# Patient Record
Sex: MALE | Race: WHITE | NOT HISPANIC OR LATINO | Employment: UNEMPLOYED | URBAN - METROPOLITAN AREA
[De-identification: names, ages, dates, MRNs, and addresses within clinical notes are randomized per-mention and may not be internally consistent; named-entity substitution may affect disease eponyms.]

---

## 2017-06-28 ENCOUNTER — TELEPHONE (OUTPATIENT)
Dept: PAIN MEDICINE | Facility: CLINIC | Age: 37
End: 2017-06-28

## 2017-06-28 NOTE — TELEPHONE ENCOUNTER
Pt mother notified before speaking to her about the pt's care, he will have to sign a involvement of care paper. Pt mother verbalized understanding. Pt is aware that we can not schedule him an appt with out a referral from his PCP because of his insurance.Once we receive refer, pt will be schedule with Dr. Velasco. Pt verbalized understanding

## 2017-06-28 NOTE — TELEPHONE ENCOUNTER
----- Message from Elza Flores sent at 2017  9:22 AM CDT -----  Contact: Cara/mother  Syed Hassan  MRN: [id number]  : 1980  PCP: Driss Key  Home Phone      [phone]  Work Phone      Not on file.  Mobile          Not on file.      MESSAGE: The patient's mother is requesting to schedule the patient a new patient appt with Dr. Velasco for back pain and shoulder pain. The patient was advised to see pain management by Dr. Key. The patient has [facility] Connections through medicaid.  Phone:[phone], [phone]

## 2017-06-30 ENCOUNTER — TELEPHONE (OUTPATIENT)
Dept: PAIN MEDICINE | Facility: CLINIC | Age: 37
End: 2017-06-30

## 2017-06-30 NOTE — TELEPHONE ENCOUNTER
Left message with pt mother to have the pt contact the office. Pt's mother verbalized understanding

## 2017-06-30 NOTE — TELEPHONE ENCOUNTER
----- Message from Michelle Carroll MA sent at 6/30/2017  2:46 PM CDT -----  HI,     The referral for this pt was faxed to [facility], however, this pt has [facility] Connections.  I just left a message with the referring providers office that no new medicaid is being accepted at this time.    Just want to let you know.  We will scan this information to pt chart.  Thanks

## 2017-07-06 ENCOUNTER — TELEPHONE (OUTPATIENT)
Dept: PAIN MEDICINE | Facility: CLINIC | Age: 37
End: 2017-07-06

## 2017-07-06 NOTE — TELEPHONE ENCOUNTER
Pt notified that we received his referral and was able to schedule an appt with Dr. Velasco on 08/11/17 at 1pm. Pt verbalized understanding. appt letter mailed

## 2017-07-06 NOTE — TELEPHONE ENCOUNTER
Left message with pt's sister to have the pt contact the office. Pt's sister verbalized understanding

## 2017-08-07 ENCOUNTER — TELEPHONE (OUTPATIENT)
Dept: PAIN MEDICINE | Facility: CLINIC | Age: 37
End: 2017-08-07

## 2017-08-11 ENCOUNTER — TELEPHONE (OUTPATIENT)
Dept: PAIN MEDICINE | Facility: CLINIC | Age: 37
End: 2017-08-11

## 2017-08-11 NOTE — TELEPHONE ENCOUNTER
Pt notified per Sol Key, Dr. Velasco is no longer accepting Medicaid at this time. Pt is aware once we get information on the Pain Management at [facility], we will schedule her an appointment and contact her with the date and time. Pt verbalized understanding

## 2017-08-11 NOTE — TELEPHONE ENCOUNTER
Pt notified per Sol Key, Dr. Velasco is no longer accepting Medicaid at this time. Pt is aware once we get information on the Pain Management at [facility], we will schedule her an appointment and contact her with the date and time. Pt verbalized understanding

## 2017-08-31 ENCOUNTER — TELEPHONE (OUTPATIENT)
Dept: PAIN MEDICINE | Facility: CLINIC | Age: 37
End: 2017-08-31

## 2017-08-31 NOTE — TELEPHONE ENCOUNTER
----- Message from Tanna Guadalupe MA sent at 8/31/2017 10:33 AM CDT -----      ----- Message -----  From: Devi Joseph  Sent: 8/31/2017  10:21 AM  To: Krista Pathak with Dr. Driss Key's Office called.   No. [phone]    Zo has called 3 times.   What is the status of the referral faxed on 6/29/17.   Please call today.

## 2017-12-04 ENCOUNTER — HOSPITAL ENCOUNTER (INPATIENT)
Facility: HOSPITAL | Age: 37
LOS: 1 days | Discharge: HOME | DRG: 897 | End: 2017-12-05
Attending: PSYCHIATRY & NEUROLOGY | Admitting: PSYCHIATRY & NEUROLOGY
Payer: MEDICAID

## 2017-12-04 DIAGNOSIS — R45.851 DEPRESSION WITH SUICIDAL IDEATION: Primary | ICD-10-CM

## 2017-12-04 DIAGNOSIS — F41.8 DEPRESSION WITH ANXIETY: ICD-10-CM

## 2017-12-04 DIAGNOSIS — F32.A DEPRESSION WITH SUICIDAL IDEATION: Primary | ICD-10-CM

## 2017-12-04 PROBLEM — F10.20 ALCOHOL USE DISORDER, SEVERE, DEPENDENCE: Status: ACTIVE | Noted: 2017-12-04

## 2017-12-04 PROBLEM — F17.210 CIGARETTE NICOTINE DEPENDENCE WITHOUT COMPLICATION: Status: ACTIVE | Noted: 2017-12-04

## 2017-12-04 PROBLEM — F10.94 ALCOHOL-INDUCED MOOD DISORDER: Status: ACTIVE | Noted: 2017-12-04

## 2017-12-04 PROCEDURE — 99223 1ST HOSP IP/OBS HIGH 75: CPT | Mod: AI,,, | Performed by: PSYCHIATRY & NEUROLOGY

## 2017-12-04 PROCEDURE — 63600175 PHARM REV CODE 636 W HCPCS: Performed by: PSYCHIATRY & NEUROLOGY

## 2017-12-04 PROCEDURE — 90833 PSYTX W PT W E/M 30 MIN: CPT | Mod: ,,, | Performed by: PSYCHIATRY & NEUROLOGY

## 2017-12-04 PROCEDURE — 25000003 PHARM REV CODE 250: Performed by: PSYCHIATRY & NEUROLOGY

## 2017-12-04 PROCEDURE — 11400000 HC PSYCH PRIVATE ROOM

## 2017-12-04 RX ORDER — ASPIRIN 325 MG
50000 TABLET, DELAYED RELEASE (ENTERIC COATED) ORAL
Status: DISCONTINUED | OUTPATIENT
Start: 2017-12-04 | End: 2017-12-05 | Stop reason: HOSPADM

## 2017-12-04 RX ORDER — THIAMINE HCL 100 MG
100 TABLET ORAL DAILY
Status: DISCONTINUED | OUTPATIENT
Start: 2017-12-04 | End: 2017-12-05 | Stop reason: HOSPADM

## 2017-12-04 RX ORDER — OLANZAPINE 10 MG/2ML
10 INJECTION, POWDER, FOR SOLUTION INTRAMUSCULAR EVERY 4 HOURS PRN
Status: DISCONTINUED | OUTPATIENT
Start: 2017-12-04 | End: 2017-12-05 | Stop reason: HOSPADM

## 2017-12-04 RX ORDER — LOPERAMIDE HYDROCHLORIDE 2 MG/1
2 CAPSULE ORAL
Status: DISCONTINUED | OUTPATIENT
Start: 2017-12-04 | End: 2017-12-05 | Stop reason: HOSPADM

## 2017-12-04 RX ORDER — DOCUSATE SODIUM 100 MG/1
100 CAPSULE, LIQUID FILLED ORAL DAILY PRN
Status: DISCONTINUED | OUTPATIENT
Start: 2017-12-04 | End: 2017-12-05 | Stop reason: HOSPADM

## 2017-12-04 RX ORDER — MAG HYDROX/ALUMINUM HYD/SIMETH 200-200-20
30 SUSPENSION, ORAL (FINAL DOSE FORM) ORAL EVERY 6 HOURS PRN
Status: DISCONTINUED | OUTPATIENT
Start: 2017-12-04 | End: 2017-12-05 | Stop reason: HOSPADM

## 2017-12-04 RX ORDER — CYANOCOBALAMIN 1000 UG/ML
1000 INJECTION, SOLUTION INTRAMUSCULAR; SUBCUTANEOUS ONCE
Status: COMPLETED | OUTPATIENT
Start: 2017-12-04 | End: 2017-12-04

## 2017-12-04 RX ORDER — OLANZAPINE 10 MG/1
10 TABLET ORAL EVERY 4 HOURS PRN
Status: DISCONTINUED | OUTPATIENT
Start: 2017-12-04 | End: 2017-12-05 | Stop reason: HOSPADM

## 2017-12-04 RX ORDER — FOLIC ACID 1 MG/1
1 TABLET ORAL DAILY
Status: DISCONTINUED | OUTPATIENT
Start: 2017-12-04 | End: 2017-12-05 | Stop reason: HOSPADM

## 2017-12-04 RX ORDER — HYDROXYZINE PAMOATE 50 MG/1
50 CAPSULE ORAL EVERY 6 HOURS PRN
Status: DISCONTINUED | OUTPATIENT
Start: 2017-12-04 | End: 2017-12-05 | Stop reason: HOSPADM

## 2017-12-04 RX ORDER — IBUPROFEN 400 MG/1
400 TABLET ORAL EVERY 6 HOURS PRN
Status: DISCONTINUED | OUTPATIENT
Start: 2017-12-04 | End: 2017-12-05 | Stop reason: HOSPADM

## 2017-12-04 RX ADMIN — THERA TABS 1 TABLET: TAB at 02:12

## 2017-12-04 RX ADMIN — Medication 1 TABLET: at 02:12

## 2017-12-04 RX ADMIN — OFLOXACIN 50000 UNITS: 300 TABLET, COATED ORAL at 02:12

## 2017-12-04 RX ADMIN — CYANOCOBALAMIN 1000 MCG: 1000 INJECTION, SOLUTION INTRAMUSCULAR; SUBCUTANEOUS at 02:12

## 2017-12-04 RX ADMIN — Medication 100 MG: at 02:12

## 2017-12-04 RX ADMIN — FOLIC ACID 1 MG: 1 TABLET ORAL at 02:12

## 2017-12-04 NOTE — PLAN OF CARE
Patient ate evening meal.  Mood is good, slept for about an hour earlier this afternoon.  Talking on telephone at this time.  Clear pathways and clutter-free environment maintained.  Will continue to monitor for safety.

## 2017-12-04 NOTE — H&P
"PSYCHIATRY INPATIENT ADMISSION NOTE - H & P      12/4/2017 12:20 PM   Syed aHssan   1980   [id number]           DATE OF ADMISSION: 12/4/2017 12:16 PM    SITE: [facility]    CURRENT LEGAL STATUS: PEC and/or CEC      HISTORY    CHIEF COMPLAINT   Syed Hassan is a 37 y.o. male with no past psychiatric history, currently admitted to the inpatient unit with the following chief complaint: threatening suicide, "I had drank too much and said something stupid."     HPI   (Elements: Location, Quality, Severity, Duration, Timing, Content, Modifying Factors, Associated Signs & Symptoms)    The patient was seen and examined. The chart was reviewed.    The patient presented to the ER on 12/3/17 with complaints of depression and threatening suicide. Per the ER and staff reports:  -Syed Hassan presents to the emergency room with threatening suicidal ideation today  Patient was in argument with his mother he put a knife his throat at home this morning PTA  Police on scene states the patient threatened to kill himself, no previous suicide attempt  Patient is depressed with several social stressors, will not talk to me about drug use now  He is not psychotic or delusional or paranoid, family fears for his safety due to this threat  -Patient had an argument with his mother.  She told him he could no longer stay there and asked him to leave.  He put a knife to his neck and stated that he was going to kill himself.  Mom called the police.  Patient states that he is not suicidal, he just wanted to scare her so she wouldn't kick him out.  Apparent ETOH  -Patient states he doesn't want to stay.  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  Patient asked for the phone to call a friend and a  to "make sure of his legal rights".      The patient was medically cleared and admitted to the U.     The patient reports that he has no prior history of psychiatric symptoms. He " "was doing well, in spite of financial stressors. He reports that he was drinking while watching the [place] game and admittedly "drank too much." He reports that his mother disapproves of drinking, so this caused an argument. While intoxicated, he admits to saying "something along the lines of you would like it if I just ." He admits to making a suicidal statement, but denied any intent. He reportedly said it in order to keep her from kicking him out.     He reports that he normally drinks about a pint of whiskey, up to 4 times per week. He has symptoms of a use disorder as documented below. He denied any history or current symptoms of withdrawal: no seizures, DT's hallucinations or other symptoms. He does have a history of benign essential tremor which complicates his withdrawal history.     He denied all psychiatric symptoms a this time and attributes the recent symptoms to intoxication (BAL was 343 at arrival to the ER).    Denied Symptoms of Depression: no diminished mood or loss of interest/anhedonia; no irritability, diminished energy, change in sleep, change in appetite, diminished concentration or cognition or indecisiveness, PMA/R, excessive guilt or hopelessness or worthlessness, or suicidal ideations    Denied changes in Sleep: no trouble with initiation, maintenance, or early morning awakening with inability to return to sleep    Denied Suicidal/Homicidal ideations: no active/passive ideations, organized plans, or future intentions    Denied Symptoms of psychosis: no hallucinations, delusions, disorganized thinking, disorganized behavior or abnormal motor behavior, or negative symptoms    Denied Symptoms of tonya or hypomania: no elevated, expansive, or irritable mood with no increased energy or activity; with inflated self-esteem or grandiosity, decreased need for sleep, increased rate of speech, FOI or racing thoughts, distractibility, increased goal directed activity or PMA, or risky/disinhibited " behavior    Denied Symptoms of FRANCESCO: no excessive anxiety/worry/fear     Denied Symptoms of Panic Disorder: no recurrent panic attacks; without agoraphobia    Denied Symptoms of PTSD: no h/o trauma; no re-experiencing/intrusive symptoms, avoidant behavior, negative alterations in cognition or mood, or hyperarousal symptoms;  without dissociative symptoms     Denied Symptoms of OCD: no obsessions or compulsions     Denied Symptoms of Eating Disorders: no anorexia, bulimia or binging    +Substance Use: +intoxication, possible withdrawal (tremors), +tolerance, +used in larger amounts or duration than intended, no unsuccessful attempts to limit or quit, no increased time engaging in or seeking out, cravings or strong desire to use, no failure to fulfill obligations, +negative consequences in social/interpersonal/occupational,/recreational areas, +use in dangerous situations, +medical or psychological consequences     +chronic back pain      PSYCHOTHERAPY ADD-ON +[id number]   30 (16-37*) minutes    Time: 16 minutes  Participants: Met with patient    Therapeutic Intervention Type: behavior modifying psychotherapy, supportive psychotherapy  Why chosen therapy is appropriate versus another modality: relevant to diagnosis, patient responds to this modality, evidence based practice    Target symptoms: alcohol abuse, psychosocial stressors  Primary focus: alcohol use, psychosocial stressors  Psychotherapeutic techniques: supportive and motivational techniques; psycho-education     Outcome monitoring methods: self-report, observation    Patient's response to intervention:  The patient's response to intervention is accepting.    Progress toward goals:  The patient's progress toward goals is fair .            PAST PSYCHIATRIC HISTORY  Previous Psychiatric Hospitalizations: denied   Previous SI/HI: denied  Previous Suicide Attempts: denied   Previous Medication Trials: denied  Psychiatric Care (current & past): denied  History of  Psychotherapy: counseling as a child  History of Violence: denied      SUBSTANCE ABUSE HISTORY   Tobacco: 1 ppd at age [age]  Alcohol: as above  Illicit Substances: denied  Misuse of Prescription Medications: denied  Detoxes: denied  Rehabs: denied  12 Step Meetings: AA, court order  Periods of Sobriety: months  Withdrawal: denied (possible h/o alcohol withdrawal        PAST MEDICAL & SURGICAL HISTORY   No past medical history on file.  No past surgical history on file.      CURRENT MEDICATION REGIMEN   Home Meds:   Prior to Admission medications    Not on File         OTC Meds: none    Scheduled Meds:    PRN Meds:    Psychotherapeutics     None            ALLERGIES   Review of patient's allergies indicates:   Allergen Reactions    Bactrim  [sulfamethoxazole-trimethoprim] Edema and Hives    Darvocet a500  [propoxyphene n-acetaminophen] Itching     Other reaction(s): dizziness  Other reaction(s): anxiety    Flomax  [tamsulosin]      Other reaction(s): dizziness    Morphine      Other reaction(s): Headache    Reglan  [metoclopramide] Nausea Only and Nausea And Vomiting     Other reaction(s): shacking         NEUROLOGIC HISTORY  Seizures: denied   Head trauma: denied       FAMILY PSYCHIATRIC HISTORY   No family history on file.           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 Miranda from 12/3/17 at 2:25 PM        EXAMINATION      PHYSICAL EXAM  Reviewed note/exam by Dr. Miranda  from 12/3/17 at 2:25 PM    VITALS   There were no vitals filed for this visit.       PAIN  9-10/10  Subjective report of pain matches objective signs and symptoms: No      LABORATORY DATA   Recent Results (from the past 72 hour(s))   Drug screen panel, emergency    Collection Time: 12/03/17  2:55 PM   Result Value Ref Range    Benzodiazepines Negative     Methadone metabolites Negative     Cocaine (Metab.) Negative     Opiate Scrn, Ur Negative     Barbiturate Screen, Ur Negative     Amphetamine Screen, Ur Negative     THC Negative     Phencyclidine Negative     Creatinine, Random Ur 80.5 23.0 - 375.0 mg/dL    Toxicology Information SEE COMMENT    Urinalysis    Collection Time: 12/03/17  2:55 PM   Result Value Ref Range    Specimen UA Urine, Clean Catch     Color, UA Yellow Yellow, Straw, Laura    Appearance, UA Clear Clear    pH, UA 6.0 5.0 - 8.0    Specific Gravity, UA 1.010 1.005 - 1.030    Protein, UA Negative Negative    Glucose, UA Negative Negative    Ketones, UA Negative Negative    Bilirubin (UA) Negative Negative    Occult Blood UA Negative Negative    Nitrite, UA Negative Negative    Urobilinogen, UA Negative <2.0 EU/dL    Leukocytes, UA Negative Negative   Folate    Collection Time: 12/03/17  3:14 PM   Result Value Ref Range    Folate <2.0 (L) 4.0 - 24.0 ng/mL   Vitamin B12    Collection Time: 12/03/17  3:14 PM   Result Value Ref Range    Vitamin B-12 344 210 - 950 pg/mL   T3, free    Collection Time: 12/03/17  3:14 PM   Result Value Ref Range    T3, Free 3.6 2.3 - 4.2 pg/mL   T4, free    Collection Time: 12/03/17  3:14 PM   Result Value Ref Range    Free T4 0.84 0.71 - 1.51 ng/dL   Vitamin D    Collection Time: 12/03/17  3:14 PM   Result Value Ref Range    Vit D, 25-Hydroxy 29 (L) 30 - 96 ng/mL   Ethanol    Collection Time: 12/03/17  3:14 PM   Result Value Ref Range    Alcohol, Medical, Serum 343 (HH) <10 mg/dL   TSH    Collection Time: 12/03/17  3:14 PM   Result Value Ref Range    TSH 1.100 0.400 -  4.000 uIU/mL   Salicylate level    Collection Time: 12/03/17  3:14 PM   Result Value Ref Range    Salicylate Lvl <5.0 (L) 15.0 - 30.0 mg/dL   Acetaminophen level    Collection Time: 12/03/17  3:14 PM   Result Value Ref Range    Acetaminophen (Tylenol), Serum <3.0 (L) 10.0 - 20.0 ug/mL   CBC auto differential    Collection Time: 12/03/17  3:14 PM   Result Value Ref Range    WBC 7.54 3.90 - 12.70 K/uL    RBC 5.05 4.60 - 6.20 M/uL    Hemoglobin 17.1 14.0 - 18.0 g/dL    Hematocrit 49.4 40.0 - 54.0 %    MCV 98 82 - 98 fL    MCH 33.9 (H) 27.0 - 31.0 pg    MCHC 34.6 32.0 - 36.0 g/dL    RDW 13.2 11.5 - 14.5 %    Platelets 239 150 - 350 K/uL    MPV 8.9 (L) 9.2 - 12.9 fL    Gran # 4.2 1.8 - 7.7 K/uL    Lymph # 2.9 1.0 - 4.8 K/uL    Mono # 0.5 0.3 - 1.0 K/uL    Eos # 0.1 0.0 - 0.5 K/uL    Baso # 0.01 0.00 - 0.20 K/uL    Gran% 55.1 38.0 - 73.0 %    Lymph% 38.1 18.0 - 48.0 %    Mono% 6.0 4.0 - 15.0 %    Eosinophil% 0.7 0.0 - 8.0 %    Basophil% 0.1 0.0 - 1.9 %    Differential Method Automated    Comprehensive metabolic panel    Collection Time: 12/03/17  3:14 PM   Result Value Ref Range    Sodium 145 136 - 145 mmol/L    Potassium 4.2 3.5 - 5.1 mmol/L    Chloride 106 95 - 110 mmol/L    CO2 26 23 - 29 mmol/L    Glucose 89 70 - 110 mg/dL    BUN, Bld 4 (L) 6 - 20 mg/dL    Creatinine 0.8 0.5 - 1.4 mg/dL    Calcium 9.2 8.7 - 10.5 mg/dL    Total Protein 7.5 6.0 - 8.4 g/dL    Albumin 4.2 3.5 - 5.2 g/dL    Total Bilirubin 0.4 0.1 - 1.0 mg/dL    Alkaline Phosphatase 105 55 - 135 U/L    AST 32 10 - 40 U/L    ALT 30 10 - 44 U/L    Anion Gap 13 8 - 16 mmol/L    eGFR if African American >60 >60 mL/min/1.73 m^2    eGFR if non African American >60 >60 mL/min/1.73 m^2   Ethanol    Collection Time: 12/04/17  5:49 AM   Result Value Ref Range    Alcohol, Medical, Serum <10 <10 mg/dL      No results found for: PHENYTOIN, PHENOBARB, VALPROATE, CBMZ        CONSTITUTIONAL  General Appearance: WM, in hospital garb; NAD    MUSCULOSKELETAL  Muscle Strength  "and Tone:  normal  Abnormal Involuntary Movements:  None aside form mild resting tremor  Gait and Station:  normal; non-ataxic    PSYCHIATRIC   Level of Consciousness: awake, alert  Orientation: p/p/t/s  Grooming:  adequate to circumstances  Psychomotor Behavior: no PMA/R  Speech: nl r/t/v/s  Language:  English fluent  Mood: "fine"  Affect: full range, anxious  Thought Process:  linear and organized  Associations:  intact; no ISABEL  Thought Content:  denied AVH/delusions; denied HI/SI  Memory:  intact to recent and remote events  Attention:  intact to conversation; not distractible   Fund of Knowledge:  age and education appropriate  Estimate if Intelligence:  average based on work/education history, vocabulary and mental status exam  Insight:  good- seeks help  Judgment:   good- no bx issues, compliant and cooperative        PSYCHOSOCIAL      PSYCHOSOCIAL STRESSORS   family, financial, occupational and drug and alcohol    FUNCTIONING RELATIONSHIPS   good support system and poor relationship with parents      STRENGTHS AND LIABILITIES   Strength: Patient accepts guidance/feedback, Strength: Patient is expressive/articulate., Liability: Patient lacks social skills., Liability: Patient lacks coping skills.      Is the patient aware of the biomedical complications associated with substance abuse and mental illness? yes    Does the patient have an Advance Directive for Mental Health treatment? no  (If yes, inform patient to bring copy.)        ASSESSMENT     IMPRESSION   Alcohol Induced Mood Disorder  Alcohol Induced Anxiety Disorder    Alcohol Use Disorder, severe, continuous, with physiological dependence   Nicotine dependence     Vitamin D insufficiency  Low B12    Psychosocial Stressors      MEDICAL DECISION MAKING        PROBLEM LIST AND MANAGEMENT PLANS    Depression  Anxiety  Alcohol use  Nicotine use  Vitamin D insufficiency  Low B12  Psychosocial Stressors        PRESCRIPTION DRUG MANAGEMENT  Compliance: " yes  Side Effects: no  Regimen Adjustments:   Depression: pt counseled; symptoms likely alcohol induced and should completely resolve with detox; continue to monitor and treat if indicated    Anxiety: pt counseled; symptoms likely alcohol induced and should completely resolve with detox; continue to monitor and treat if indicated; vistaril prn    Alcohol use: pt counseled; outpatient tx/12-step meetings once stabilized; continue to monitor for s/s of withdrawal and detox as indicated    Nicotine use: pt counseled; nicotine patch daily; he does not want to quit at this time    Vitamin D insufficiency: Vitamin d3 50,000 units po q week x 8 weeks (1/8 given)    Low B12: B12 1000 mcg IM x 1 today; Foltx po q day     Psychosocial Stressors: pt counseled; SW to assist with resources      DIAGNOSTIC TESTING  Labs reviewed; follow up pending labs    Disposition:  -SW to assist with aftercare planning and collateral  -Once stable discharge home with outpatient follow up care and/or rehab  -Continue inpatient treatment under a PEC and/or CEC for danger to self as evident by recently expressed SI. Symptoms were likely alcohol induced and should resolve with detox. Patient should be stable for discharge home tomorrow pending collateral and state of withdrawals.       Chava Flores MD  Psychiatry
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
## 2017-12-05 VITALS
DIASTOLIC BLOOD PRESSURE: 65 MMHG | TEMPERATURE: 99 F | BODY MASS INDEX: 25.02 KG/M2 | RESPIRATION RATE: 18 BRPM | SYSTOLIC BLOOD PRESSURE: 123 MMHG | WEIGHT: 178.75 LBS | HEART RATE: 88 BPM | HEIGHT: 71 IN

## 2017-12-05 PROBLEM — F10.94 ALCOHOL-INDUCED MOOD DISORDER: Status: RESOLVED | Noted: 2017-12-04 | Resolved: 2017-12-05

## 2017-12-05 PROBLEM — K21.9 CHRONIC GERD: Status: ACTIVE | Noted: 2017-12-05

## 2017-12-05 PROBLEM — F41.8 DEPRESSION WITH ANXIETY: Status: RESOLVED | Noted: 2017-12-04 | Resolved: 2017-12-05

## 2017-12-05 LAB
CHOLEST SERPL-MCNC: 192 MG/DL
CHOLEST/HDLC SERPL: 3.5 {RATIO}
HDLC SERPL-MCNC: 55 MG/DL
HDLC SERPL: 28.6 %
LDLC SERPL CALC-MCNC: 116.6 MG/DL
NONHDLC SERPL-MCNC: 137 MG/DL
TRIGL SERPL-MCNC: 102 MG/DL

## 2017-12-05 PROCEDURE — 36415 COLL VENOUS BLD VENIPUNCTURE: CPT

## 2017-12-05 PROCEDURE — 99239 HOSP IP/OBS DSCHRG MGMT >30: CPT | Mod: ,,, | Performed by: PSYCHIATRY & NEUROLOGY

## 2017-12-05 PROCEDURE — 25000003 PHARM REV CODE 250: Performed by: PSYCHIATRY & NEUROLOGY

## 2017-12-05 PROCEDURE — 99222 1ST HOSP IP/OBS MODERATE 55: CPT | Mod: ,,, | Performed by: INTERNAL MEDICINE

## 2017-12-05 PROCEDURE — 80061 LIPID PANEL: CPT

## 2017-12-05 RX ORDER — ASPIRIN 325 MG
50000 TABLET, DELAYED RELEASE (ENTERIC COATED) ORAL
Qty: 7 CAPSULE | Refills: 0 | Status: SHIPPED | OUTPATIENT
Start: 2017-12-11 | End: 2019-09-11

## 2017-12-05 RX ADMIN — Medication 100 MG: at 08:12

## 2017-12-05 RX ADMIN — Medication 1 TABLET: at 08:12

## 2017-12-05 RX ADMIN — FOLIC ACID 1 MG: 1 TABLET ORAL at 08:12

## 2017-12-05 RX ADMIN — THERA TABS 1 TABLET: TAB at 08:12

## 2017-12-05 NOTE — SUBJECTIVE & OBJECTIVE
Past Medical History:   Diagnosis Date    Alcohol abuse     Anxiety     Depression     History of psychiatric hospitalization     Withdrawal symptoms, alcohol        History reviewed. No pertinent surgical history.    Review of patient's allergies indicates:   Allergen Reactions    Bactrim  [sulfamethoxazole-trimethoprim] Edema and Hives    Darvocet a500  [propoxyphene n-acetaminophen] Itching     Other reaction(s): dizziness  Other reaction(s): anxiety    Flomax  [tamsulosin]      Other reaction(s): dizziness    Morphine      Other reaction(s): Headache    Reglan  [metoclopramide] Nausea Only and Nausea And Vomiting     Other reaction(s): shacking       Current Facility-Administered Medications on File Prior to Encounter   Medication    [DISCONTINUED] diphenhydrAMINE injection 50 mg    [DISCONTINUED] haloperidol lactate injection 5 mg    [DISCONTINUED] lorazepam injection 2 mg     No current outpatient prescriptions on file prior to encounter.     Family History     None        Social History Main Topics    Smoking status: Current Every Day Smoker     Packs/day: 1.00     Years: 15.00     Types: Cigarettes     Start date: 1/1/1999    Smokeless tobacco: Never Used      Comment: Counseled on smoking cessation    Alcohol use 1.2 oz/week     2 Cans of beer per week      Comment: Drinks one pint of whiskey daily.      Drug use: No    Sexual activity: No     Review of Systems   Constitutional: Negative for activity change, fatigue, fever and unexpected weight change.   HENT: Negative for congestion, ear pain, hearing loss, rhinorrhea and sore throat.    Eyes: Negative for pain, redness and visual disturbance.   Respiratory: Negative for cough, shortness of breath and wheezing.    Cardiovascular: Negative for chest pain, palpitations and leg swelling.   Gastrointestinal: Positive for nausea and vomiting. Negative for abdominal pain, blood in stool, constipation and diarrhea.   Genitourinary: Negative for  decreased urine volume, dysuria, frequency and urgency.   Musculoskeletal: Negative for back pain, joint swelling and neck pain.   Skin: Negative for color change, rash and wound.   Neurological: Negative for dizziness, tremors, weakness, light-headedness and headaches.     Objective:     Vital Signs (Most Recent):  Temp: 98.8 °F (37.1 °C) (12/04/17 2111)  Pulse: 73 (12/04/17 2111)  Resp: 18 (12/04/17 2111)  BP: 125/81 (12/04/17 2111) Vital Signs (24h Range):  Temp:  [98.2 °F (36.8 °C)-98.9 °F (37.2 °C)] 98.8 °F (37.1 °C)  Pulse:  [61-74] 73  Resp:  [18] 18  BP: (125-142)/(77-91) 125/81     Weight: 81.1 kg (178 lb 12 oz)  Body mass index is 24.93 kg/m².    Physical Exam   Constitutional: He is oriented to person, place, and time. He appears well-developed and well-nourished. No distress.   HENT:   Head: Normocephalic and atraumatic.   Right Ear: External ear normal.   Left Ear: External ear normal.   Mouth/Throat: Oropharynx is clear and moist. No oropharyngeal exudate.   Eyes: Conjunctivae and EOM are normal. Pupils are equal, round, and reactive to light. Right eye exhibits no discharge. Left eye exhibits no discharge.   Neck: Neck supple. No tracheal deviation present.   Cardiovascular: Normal rate and regular rhythm.  Exam reveals no gallop and no friction rub.    No murmur heard.  Pulmonary/Chest: Effort normal and breath sounds normal. No respiratory distress. He has no wheezes. He has no rales.   Abdominal: Soft. Bowel sounds are normal. He exhibits no distension. There is no tenderness. There is no rebound and no guarding.   Musculoskeletal: He exhibits no edema.   Neurological: He is alert and oriented to person, place, and time. No cranial nerve deficit.     Neuro: Cranial nerves:  CN II Visual fields full to confrontation.   CN III, IV, VI Pupils are equal, round, and reactive to light.  CN III: no palsy  Nystagmus: none   Diplopia: none  Ophthalmoparesis: none  CN V Facial sensation intact.   CN VII  Facial expression full, symmetric.   CN VIII normal.   CN IX normal.   CN X normal.   CN XI normal.   CN XII normal.         Skin: Skin is warm and dry.   Psychiatric: He has a normal mood and affect. His behavior is normal.   Nursing note and vitals reviewed.      Significant Labs:   CBC:   Recent Labs  Lab 12/03/17  1514   WBC 7.54   HGB 17.1   HCT 49.4        CMP:   Recent Labs  Lab 12/03/17  1514      K 4.2      CO2 26   GLU 89   BUN 4*   CREATININE 0.8   CALCIUM 9.2   PROT 7.5   ALBUMIN 4.2   BILITOT 0.4   ALKPHOS 105   AST 32   ALT 30   ANIONGAP 13   EGFRNONAA >60     All pertinent labs within the past 24 hours have been reviewed.    Significant Imaging: I have reviewed all pertinent imaging results/findings within the past 24 hours.

## 2017-12-05 NOTE — PLAN OF CARE
"Problem: Patient Care Overview (Adult)  Goal: Interdisciplinary Rounds/Family Conf  TREATMENT TEAM UPDATE    Chief Complaint:  Patient admitted with suicidal ideation, substance use. Patient and mother argued.    Per er   Syed Hassan presents to the emergency room with threatening suicidal ideation today.  Patient was in argument with his mother he put a knife his throat at home this morning PTA.  Police on scene states the patient threatened to kill himself, no previous suicide attempt  Patient is depressed with several social stressors, will not talk to me about drug use now.   He is not psychotic or delusional or paranoid, family fears for his safety due to this threat    Current:  Patient attended treatment team dressed in own clothes. Patient denies SI   "I drank too much and my mom got emotional with me and overexaggerated on what she aid and I overexaggerated on what I did. I Talked with her on the phone last nite and she's ready for me to come home. Says she didn't mean for me to come to the hospital.  A lot of the reason she got aggravated is that she doesn't like my drinking, but I hadn't cleaned up the house. I had just come home from the [facility] and had some mud on the floor and she just blew up on me. Told me I wasn't taking care of her stuff and she knows I have no where to go right now. So I just pulled out my hunting knife and said what do you want me to do. We both just were emotional. I don't like being yelled at.   Maybe I do need to slow down on the drinking. I dont think I need to quit, I like to drink.   We need to talk to each other better. Patient states he will FU with AA meetings.     Plan:  D/C to home   [facility]              "

## 2017-12-05 NOTE — HPI
"Patient admitted from ER with SI. Was drinking EtOH and states it was a "misunderstanding" with his mom. He reports h/o HTN but has been off medicatons for 1 year. Reports h/o GERD s/p Aidan procedure for hiatal hernia. He reports intermittent nausea and vomiting but this is chronic and not on any medications at home.   "

## 2017-12-05 NOTE — CONSULTS
"[facility] Medicine  Consult Note    Patient Name: Syed Hassan  MRN: [id number]  Admission Date: 12/4/2017  Hospital Length of Stay: 1 days  Attending Physician: Chava Flores MD   Primary Care Provider: Driss Key MD           Patient information was obtained from patient and ER records.     Consults  Subjective:     Principal Problem: Alcohol-induced mood disorder    Chief Complaint: No chief complaint on file.       HPI: Patient admitted from ER with SI. Was drinking EtOH and states it was a "misunderstanding" with his mom. He reports h/o HTN but has been off medicatons for 1 year. Reports h/o GERD s/p Aidan procedure for hiatal hernia. He reports intermittent nausea and vomiting but this is chronic and not on any medications at home.     Past Medical History:   Diagnosis Date    Alcohol abuse     Anxiety     Depression     History of psychiatric hospitalization     Withdrawal symptoms, alcohol        History reviewed. No pertinent surgical history.    Review of patient's allergies indicates:   Allergen Reactions    Bactrim  [sulfamethoxazole-trimethoprim] Edema and Hives    Darvocet a500  [propoxyphene n-acetaminophen] Itching     Other reaction(s): dizziness  Other reaction(s): anxiety    Flomax  [tamsulosin]      Other reaction(s): dizziness    Morphine      Other reaction(s): Headache    Reglan  [metoclopramide] Nausea Only and Nausea And Vomiting     Other reaction(s): shacking       Current Facility-Administered Medications on File Prior to Encounter   Medication    [DISCONTINUED] diphenhydrAMINE injection 50 mg    [DISCONTINUED] haloperidol lactate injection 5 mg    [DISCONTINUED] lorazepam injection 2 mg     No current outpatient prescriptions on file prior to encounter.     Family History     None        Social History Main Topics    Smoking status: Current Every Day Smoker     Packs/day: 1.00     Years: 15.00     Types: Cigarettes     Start " date: 1/1/1999    Smokeless tobacco: Never Used      Comment: Counseled on smoking cessation    Alcohol use 1.2 oz/week     2 Cans of beer per week      Comment: Drinks one pint of whiskey daily.      Drug use: No    Sexual activity: No     Review of Systems   Constitutional: Negative for activity change, fatigue, fever and unexpected weight change.   HENT: Negative for congestion, ear pain, hearing loss, rhinorrhea and sore throat.    Eyes: Negative for pain, redness and visual disturbance.   Respiratory: Negative for cough, shortness of breath and wheezing.    Cardiovascular: Negative for chest pain, palpitations and leg swelling.   Gastrointestinal: Positive for nausea and vomiting. Negative for abdominal pain, blood in stool, constipation and diarrhea.   Genitourinary: Negative for decreased urine volume, dysuria, frequency and urgency.   Musculoskeletal: Negative for back pain, joint swelling and neck pain.   Skin: Negative for color change, rash and wound.   Neurological: Negative for dizziness, tremors, weakness, light-headedness and headaches.     Objective:     Vital Signs (Most Recent):  Temp: 98.8 °F (37.1 °C) (12/04/17 2111)  Pulse: 73 (12/04/17 2111)  Resp: 18 (12/04/17 2111)  BP: 125/81 (12/04/17 2111) Vital Signs (24h Range):  Temp:  [98.2 °F (36.8 °C)-98.9 °F (37.2 °C)] 98.8 °F (37.1 °C)  Pulse:  [61-74] 73  Resp:  [18] 18  BP: (125-142)/(77-91) 125/81     Weight: 81.1 kg (178 lb 12 oz)  Body mass index is 24.93 kg/m².    Physical Exam   Constitutional: He is oriented to person, place, and time. He appears well-developed and well-nourished. No distress.   HENT:   Head: Normocephalic and atraumatic.   Right Ear: External ear normal.   Left Ear: External ear normal.   Mouth/Throat: Oropharynx is clear and moist. No oropharyngeal exudate.   Eyes: Conjunctivae and EOM are normal. Pupils are equal, round, and reactive to light. Right eye exhibits no discharge. Left eye exhibits no discharge.   Neck:  Neck supple. No tracheal deviation present.   Cardiovascular: Normal rate and regular rhythm.  Exam reveals no gallop and no friction rub.    No murmur heard.  Pulmonary/Chest: Effort normal and breath sounds normal. No respiratory distress. He has no wheezes. He has no rales.   Abdominal: Soft. Bowel sounds are normal. He exhibits no distension. There is no tenderness. There is no rebound and no guarding.   Musculoskeletal: He exhibits no edema.   Neurological: He is alert and oriented to person, place, and time. No cranial nerve deficit.     Neuro: Cranial nerves:  CN II Visual fields full to confrontation.   CN III, IV, VI Pupils are equal, round, and reactive to light.  CN III: no palsy  Nystagmus: none   Diplopia: none  Ophthalmoparesis: none  CN V Facial sensation intact.   CN VII Facial expression full, symmetric.   CN VIII normal.   CN IX normal.   CN X normal.   CN XI normal.   CN XII normal.         Skin: Skin is warm and dry.   Psychiatric: He has a normal mood and affect. His behavior is normal.   Nursing note and vitals reviewed.      Significant Labs:   CBC:   Recent Labs  Lab 12/03/17  1514   WBC 7.54   HGB 17.1   HCT 49.4        CMP:   Recent Labs  Lab 12/03/17  1514      K 4.2      CO2 26   GLU 89   BUN 4*   CREATININE 0.8   CALCIUM 9.2   PROT 7.5   ALBUMIN 4.2   BILITOT 0.4   ALKPHOS 105   AST 32   ALT 30   ANIONGAP 13   EGFRNONAA >60     All pertinent labs within the past 24 hours have been reviewed.    Significant Imaging: I have reviewed all pertinent imaging results/findings within the past 24 hours.    Assessment/Plan:     * Alcohol-induced mood disorder    Orders per psych          Chronic GERD    Was not on outpatient medications. If needed while inpatient can add pantoprazole 40mg PO Qday but sx seem stable so will not add now          Cigarette nicotine dependence without complication    Orders per psych          Alcohol use disorder, severe, dependence    Orders per  psych          Depression with anxiety    Orders per psych            VTE Risk Mitigation     None              Thank you for your consult. I will sign off. Please contact us if you have any additional questions.    Barbara Baires MD  Department of Hospital Medicine   [facility]

## 2017-12-05 NOTE — PLAN OF CARE
Problem: Patient Care Overview (Adult)  Goal: Plan of Care Review  Outcome: Ongoing (interventions implemented as appropriate)  Pt calm and cooperative, interacts well with staff and peers, denies SI or HI, showered, safety precautions maintained, will continue to monitor

## 2017-12-05 NOTE — ASSESSMENT & PLAN NOTE
Was not on outpatient medications. If needed while inpatient can add pantoprazole 40mg PO Qday but sx seem stable so will not add now

## 2017-12-05 NOTE — PROGRESS NOTES
PSYCHOTHERAPY ADD-ON [id number]   30 (16-37*) minutes      Time: 18 minutes  Participants: Met with patient    Therapeutic Intervention Type: insight oriented psychotherapy, behavior modifying psychotherapy, supportive psychotherapy  Why chosen therapy is appropriate versus another modality: relevant to diagnosis, patient responds to this modality, evidence based practice    Target symptoms: substance abuse  Primary focus: substance use, psychosocial stressors  Psychotherapeutic techniques: supportive, motivational and interpersonal techniques; psycho-education    Outcome monitoring methods: self-report, observation    Patient's response to intervention:  The patient's response to intervention is accepting.    Progress toward goals:  The patient's progress toward goals is good.    Chava Flores MD  Psychiatry

## 2017-12-05 NOTE — DISCHARGE SUMMARY
"Discharge Summary  Psychiatry    Admit Date: 12/4/2017    Discharge Date and Time:  12/05/2017 8:57 AM    Attending Physician: Chava Flores MD     Discharge Provider: Chava Flores MD    Reason for Admission:  threatening suicide, "I had drank too much and said something stupid."     History of Present Illness:   The patient presented to the ER on 12/3/17 with complaints of depression and threatening suicide. Per the ER and staff reports:  -Syed Hassan presents to the emergency room with threatening suicidal ideation today  Patient was in argument with his mother he put a knife his throat at home this morning PTA  Police on scene states the patient threatened to kill himself, no previous suicide attempt  Patient is depressed with several social stressors, will not talk to me about drug use now  He is not psychotic or delusional or paranoid, family fears for his safety due to this threat  -Patient had an argument with his mother.  She told him he could no longer stay there and asked him to leave.  He put a knife to his neck and stated that he was going to kill himself.  Mom called the police.  Patient states that he is not suicidal, he just wanted to scare her so she wouldn't kick him out.  Apparent ETOH  -Patient states he doesn't want to stay.  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  Patient asked for the phone to call a friend and a  to "make sure of his legal rights".       The patient was medically cleared and admitted to the [facility].      The patient reports that he has no prior history of psychiatric symptoms. He was doing well, in spite of financial stressors. He reports that he was drinking while watching the [place] game and admittedly "drank too much." He reports that his mother disapproves of drinking, so this caused an argument. While intoxicated, he admits to saying "something along the lines of you would like it if I just " "." He admits to making a suicidal statement, but denied any intent. He reportedly said it in order to keep her from kicking him out.      He reports that he normally drinks about a pint of whiskey, up to 4 times per week. He has symptoms of a use disorder as documented below. He denied any history or current symptoms of withdrawal: no seizures, DT's hallucinations or other symptoms. He does have a history of benign essential tremor which complicates his withdrawal history.      He denied all psychiatric symptoms a this time and attributes the recent symptoms to intoxication (BAL was 343 at arrival to the ER).     Denied Symptoms of Depression: no diminished mood or loss of interest/anhedonia; no irritability, diminished energy, change in sleep, change in appetite, diminished concentration or cognition or indecisiveness, PMA/R, excessive guilt or hopelessness or worthlessness, or suicidal ideations     Denied changes in Sleep: no trouble with initiation, maintenance, or early morning awakening with inability to return to sleep     Denied Suicidal/Homicidal ideations: no active/passive ideations, organized plans, or future intentions     Denied Symptoms of psychosis: no hallucinations, delusions, disorganized thinking, disorganized behavior or abnormal motor behavior, or negative symptoms     Denied Symptoms of tonya or hypomania: no elevated, expansive, or irritable mood with no increased energy or activity; with inflated self-esteem or grandiosity, decreased need for sleep, increased rate of speech, FOI or racing thoughts, distractibility, increased goal directed activity or PMA, or risky/disinhibited behavior     Denied Symptoms of FRANCESCO: no excessive anxiety/worry/fear      Denied Symptoms of Panic Disorder: no recurrent panic attacks; without agoraphobia     Denied Symptoms of PTSD: no h/o trauma; no re-experiencing/intrusive symptoms, avoidant behavior, negative alterations in cognition or mood, or " hyperarousal symptoms;  without dissociative symptoms      Denied Symptoms of OCD: no obsessions or compulsions      Denied Symptoms of Eating Disorders: no anorexia, bulimia or binging     +Substance Use: +intoxication, possible withdrawal (tremors), +tolerance, +used in larger amounts or duration than intended, no unsuccessful attempts to limit or quit, no increased time engaging in or seeking out, cravings or strong desire to use, no failure to fulfill obligations, +negative consequences in social/interpersonal/occupational,/recreational areas, +use in dangerous situations, +medical or psychological consequences      +chronic back pain    Procedures Performed: * No surgery found *    Hospital Course (synopsis of major diagnoses, care, treatment, and services provided during the course of the hospital stay):   The patient was stabilized and discharged on the following medications:  Depression: symptoms were alcohol induced resolved with detox     Anxiety: symptoms were alcohol induced resolved with detox     Alcohol use: pt counseled; outpatient tx/12-step meetings; no current s/s of withdrawal     Nicotine use: pt counseled; nicotine patch daily; he does not want to quit at this time     Vitamin D insufficiency: Vitamin d3 50,000 units po q week x 8 weeks (1/8 given)     Low B12: B12 1000 mcg IM x ; Foltx po q day      Psychosocial Stressors: pt counseled; SW assisted with resources     The patient was compliant with treatment. The patient denied any side effects.     Denied Symptoms of Depression: no diminished mood or loss of interest/anhedonia; no irritability, diminished energy, change in sleep, change in appetite, diminished concentration or cognition or indecisiveness, PMA/R, excessive guilt or hopelessness or worthlessness, or suicidal ideations     Denied changes in Sleep: no trouble with initiation, maintenance, or early morning awakening with inability to return to sleep     Denied Suicidal/Homicidal  ideations: no active/passive ideations, organized plans, or future intentions     Denied Symptoms of psychosis: no hallucinations, delusions, disorganized thinking, disorganized behavior or abnormal motor behavior, or negative symptoms     Denied Symptoms of tonya or hypomania: no elevated, expansive, or irritable mood with no increased energy or activity; with inflated self-esteem or grandiosity, decreased need for sleep, increased rate of speech, FOI or racing thoughts, distractibility, increased goal directed activity or PMA, or risky/disinhibited behavior    Denied current symptoms of anxiety.    Discussed diagnosis, risks and benefits of proposed treatment vs alternative treatments vs no treatment, and potential side effects of these treatments.  The patient expresses understanding of the above and displays the capacity to agree with this treatment given said understanding.  Patient also agrees that, currently, the benefits outweigh the risks and would like to pursue treatment at this time.    MSE: stated age, casually dressed, well groomed.  No psychomotor agitation or retardation.  No abnormal involuntary movements.  Gait normal.  Speech normal, conversational.  Language fluent English. Mood fine.  Affect normal range, pleasant, euthymic.  Thought process linear.  Associations intact.  Denies suicidal or homicidal ideation.  Denies auditory hallucinations, paranoid ideation, ideas of reference.  Memory intact.  Attention intact.  Fund of knowledge intact.  Insight intact.  Judgment intact.  Alert and oriented to person, place, time.      Tobacco Usage:  Is patient a smoker? Yes  Does patient want prescription for Tobacco Cessation? No  Does patient want counseling for Tobacco Cessation? No    If patient would like to quit, then over the counter nicotine patch could be used. The patient could also follow up with his PCP or psychiatric provider for other alternatives.     Final Diagnoses:    Principal Problem:  Alcohol Induced Mood Disorder- resolved   Secondary Diagnoses:   Alcohol Induced Anxiety Disorder- resolved     Alcohol Use Disorder, severe, continuous, with physiological dependence   Nicotine dependence      Vitamin D insufficiency  Low B12     Psychosocial Stressors    Labs:  Admission on 12/04/2017   Component Date Value Ref Range Status    Cholesterol 12/05/2017 192  120 - 199 mg/dL Final    Triglycerides 12/05/2017 102  30 - 150 mg/dL Final    HDL 12/05/2017 55  40 - 75 mg/dL Final    LDL Cholesterol 12/05/2017 116.6  63.0 - 159.0 mg/dL Final    HDL/Chol Ratio 12/05/2017 28.6  20.0 - 50.0 % Final    Total Cholesterol/HDL Ratio 12/05/2017 3.5  2.0 - 5.0 Final    Non-HDL Cholesterol 12/05/2017 137  mg/dL Final   Admission on 12/03/2017, Discharged on 12/04/2017   Component Date Value Ref Range Status    Folate 12/03/2017 <2.0* 4.0 - 24.0 ng/mL Final    Vitamin B-12 12/03/2017 344  210 - 950 pg/mL Final    T3, Free 12/03/2017 3.6  2.3 - 4.2 pg/mL Final    Free T4 12/03/2017 0.84  0.71 - 1.51 ng/dL Final    Vit D, 25-Hydroxy 12/03/2017 29* 30 - 96 ng/mL Final    Alcohol, Medical, Serum 12/03/2017 343* <10 mg/dL Final    TSH 12/03/2017 1.100  0.400 - 4.000 uIU/mL Final    Benzodiazepines 12/03/2017 Negative   Final    Methadone metabolites 12/03/2017 Negative   Final    Cocaine (Metab.) 12/03/2017 Negative   Final    Opiate Scrn, Ur 12/03/2017 Negative   Final    Barbiturate Screen, Ur 12/03/2017 Negative   Final    Amphetamine Screen, Ur 12/03/2017 Negative   Final    THC 12/03/2017 Negative   Final    Phencyclidine 12/03/2017 Negative   Final    Creatinine, Random Ur 12/03/2017 80.5  23.0 - 375.0 mg/dL Final    Toxicology Information 12/03/2017 SEE COMMENT   Final    Salicylate Lvl 12/03/2017 <5.0* 15.0 - 30.0 mg/dL Final    Acetaminophen (Tylenol), Serum 12/03/2017 <3.0* 10.0 - 20.0 ug/mL Final    WBC 12/03/2017 7.54  3.90 - 12.70 K/uL Final    RBC 12/03/2017 5.05  4.60 - 6.20 M/uL  Final    Hemoglobin 12/03/2017 17.1  14.0 - 18.0 g/dL Final    Hematocrit 12/03/2017 49.4  40.0 - 54.0 % Final    MCV 12/03/2017 98  82 - 98 fL Final    MCH 12/03/2017 33.9* 27.0 - 31.0 pg Final    MCHC 12/03/2017 34.6  32.0 - 36.0 g/dL Final    RDW 12/03/2017 13.2  11.5 - 14.5 % Final    Platelets 12/03/2017 239  150 - 350 K/uL Final    MPV 12/03/2017 8.9* 9.2 - 12.9 fL Final    Gran # 12/03/2017 4.2  1.8 - 7.7 K/uL Final    Lymph # 12/03/2017 2.9  1.0 - 4.8 K/uL Final    Mono # 12/03/2017 0.5  0.3 - 1.0 K/uL Final    Eos # 12/03/2017 0.1  0.0 - 0.5 K/uL Final    Baso # 12/03/2017 0.01  0.00 - 0.20 K/uL Final    Gran% 12/03/2017 55.1  38.0 - 73.0 % Final    Lymph% 12/03/2017 38.1  18.0 - 48.0 % Final    Mono% 12/03/2017 6.0  4.0 - 15.0 % Final    Eosinophil% 12/03/2017 0.7  0.0 - 8.0 % Final    Basophil% 12/03/2017 0.1  0.0 - 1.9 % Final    Differential Method 12/03/2017 Automated   Final    Sodium 12/03/2017 145  136 - 145 mmol/L Final    Potassium 12/03/2017 4.2  3.5 - 5.1 mmol/L Final    Chloride 12/03/2017 106  95 - 110 mmol/L Final    CO2 12/03/2017 26  23 - 29 mmol/L Final    Glucose 12/03/2017 89  70 - 110 mg/dL Final    BUN, Bld 12/03/2017 4* 6 - 20 mg/dL Final    Creatinine 12/03/2017 0.8  0.5 - 1.4 mg/dL Final    Calcium 12/03/2017 9.2  8.7 - 10.5 mg/dL Final    Total Protein 12/03/2017 7.5  6.0 - 8.4 g/dL Final    Albumin 12/03/2017 4.2  3.5 - 5.2 g/dL Final    Total Bilirubin 12/03/2017 0.4  0.1 - 1.0 mg/dL Final    Alkaline Phosphatase 12/03/2017 105  55 - 135 U/L Final    AST 12/03/2017 32  10 - 40 U/L Final    ALT 12/03/2017 30  10 - 44 U/L Final    Anion Gap 12/03/2017 13  8 - 16 mmol/L Final    eGFR if African American 12/03/2017 >60  >60 mL/min/1.73 m^2 Final    eGFR if non African American 12/03/2017 >60  >60 mL/min/1.73 m^2 Final    Specimen UA 12/03/2017 Urine, Clean Catch   Final    Color, UA 12/03/2017 Yellow  Yellow, Straw, Laura Final    Appearance, UA  12/03/2017 Clear  Clear Final    pH, UA 12/03/2017 6.0  5.0 - 8.0 Final    Specific Gravity, UA 12/03/2017 1.010  1.005 - 1.030 Final    Protein, UA 12/03/2017 Negative  Negative Final    Glucose, UA 12/03/2017 Negative  Negative Final    Ketones, UA 12/03/2017 Negative  Negative Final    Bilirubin (UA) 12/03/2017 Negative  Negative Final    Occult Blood UA 12/03/2017 Negative  Negative Final    Nitrite, UA 12/03/2017 Negative  Negative Final    Urobilinogen, UA 12/03/2017 Negative  <2.0 EU/dL Final    Leukocytes, UA 12/03/2017 Negative  Negative Final    Alcohol, Medical, Serum 12/04/2017 <10  <10 mg/dL Final         Discharged Condition: stable and improved; not currently a danger to self/others or gravely disabled    Disposition: Home or Self Care    Is patient being discharged on multiple neuroleptics? No    Follow Up/Patient Instructions:     Medications:  Reconciled Home Medications:   Current Discharge Medication List      START taking these medications    Details   cholecalciferol, vitamin D3, 50,000 unit capsule Take 1 capsule (50,000 Units total) by mouth every 7 days.  Qty: 7 capsule, Refills: 0      folic acid-vit B6-vit B12 2.5-25-2 mg (FOLBIC OR EQUIV) 2.5-25-2 mg Tab Take 1 tablet by mouth once daily.  Qty: 30 tablet, Refills: 1           No discharge procedures on file.    Follow up at [facility]- see  note/discharge note    Diet: regular     Activity as tolerated    Total time spent discharging patient: 32 minutes    Chava Flores MD  Psychiatry

## 2017-12-05 NOTE — NURSING
Patient discharged from [facility].  Patient will be following up at [facility], [address] [place] [id number].  Appointment will be on 12/07/2017 at 8:30 a.m.  Patient will receive a tobacco cessation appointment and substance abuse appointment at above appointment due to being positive for alcohol.  AVS was faxed 12/05/2017 at 12 noon.  Escorted off of unit and out of hospital, accompanied by mental health tech and grandfather.

## 2019-02-04 PROBLEM — G62.1 ALCOHOL-INDUCED POLYNEUROPATHY: Status: ACTIVE | Noted: 2019-02-04

## 2019-09-11 ENCOUNTER — OFFICE VISIT (OUTPATIENT)
Dept: URGENT CARE | Facility: CLINIC | Age: 39
End: 2019-09-11
Payer: MEDICAID

## 2019-09-11 VITALS
RESPIRATION RATE: 20 BRPM | SYSTOLIC BLOOD PRESSURE: 145 MMHG | OXYGEN SATURATION: 98 % | HEART RATE: 88 BPM | HEIGHT: 73 IN | DIASTOLIC BLOOD PRESSURE: 92 MMHG | WEIGHT: 177 LBS | BODY MASS INDEX: 23.46 KG/M2 | TEMPERATURE: 99 F

## 2019-09-11 DIAGNOSIS — K04.7 DENTAL ABSCESS: Primary | ICD-10-CM

## 2019-09-11 DIAGNOSIS — L03.211 FACIAL CELLULITIS: ICD-10-CM

## 2019-09-11 PROCEDURE — 99214 OFFICE O/P EST MOD 30 MIN: CPT | Mod: S$GLB,,, | Performed by: PHYSICIAN ASSISTANT

## 2019-09-11 PROCEDURE — 99214 PR OFFICE/OUTPT VISIT, EST, LEVL IV, 30-39 MIN: ICD-10-PCS | Mod: S$GLB,,, | Performed by: PHYSICIAN ASSISTANT

## 2019-09-11 RX ORDER — CLINDAMYCIN HYDROCHLORIDE 300 MG/1
300 CAPSULE ORAL 3 TIMES DAILY
Qty: 30 CAPSULE | Refills: 0 | Status: SHIPPED | OUTPATIENT
Start: 2019-09-11 | End: 2019-09-11 | Stop reason: CLARIF

## 2019-09-11 RX ORDER — CEFTRIAXONE 1 G/1
1 INJECTION, POWDER, FOR SOLUTION INTRAMUSCULAR; INTRAVENOUS
Status: DISCONTINUED | OUTPATIENT
Start: 2019-09-11 | End: 2019-09-11

## 2019-09-11 NOTE — PROGRESS NOTES
"Subjective:       Patient ID: Syed Hassan is a 39 y.o. male.    Vitals:  height is 6' 1" (1.854 m) and weight is 80.3 kg (177 lb). His tympanic temperature is 98.8 °F (37.1 °C). His blood pressure is 145/92 (abnormal) and his pulse is 88. His respiration is 20 and oxygen saturation is 98%.     Chief Complaint: Dental Pain    Dental Pain    This is a new problem. The current episode started more than 1 year ago. The problem occurs constantly. The problem has been gradually worsening. The pain is at a severity of 8/10. The pain is moderate. Associated symptoms include facial pain and sinus pressure. Pertinent negatives include no fever. He has tried acetaminophen for the symptoms. The treatment provided no relief.       Constitution: Negative for chills, fatigue and fever.   HENT: Positive for dental problem, facial swelling and sinus pressure. Negative for congestion and sore throat.    Neck: Negative for painful lymph nodes.   Cardiovascular: Negative for chest pain and leg swelling.   Eyes: Negative for double vision and blurred vision.   Respiratory: Negative for cough and shortness of breath.    Gastrointestinal: Negative for nausea, vomiting and diarrhea.   Genitourinary: Negative for dysuria, frequency and urgency.   Musculoskeletal: Negative for joint pain, joint swelling, muscle cramps and muscle ache.   Skin: Negative for color change, pale and rash.   Allergic/Immunologic: Negative for seasonal allergies.   Neurological: Negative for dizziness, history of vertigo, light-headedness, passing out and headaches.   Hematologic/Lymphatic: Negative for swollen lymph nodes, easy bruising/bleeding and history of blood clots. Does not bruise/bleed easily.   Psychiatric/Behavioral: Negative for nervous/anxious, sleep disturbance and depression. The patient is not nervous/anxious.        Objective:      Physical Exam   Constitutional: He is oriented to person, place, and time. He appears well-developed and " well-nourished. He is cooperative.  Non-toxic appearance. He does not have a sickly appearance. He does not appear ill. No distress.   HENT:   Head: Normocephalic and atraumatic.       Right Ear: Hearing, tympanic membrane, external ear and ear canal normal.   Left Ear: Hearing, tympanic membrane, external ear and ear canal normal.   Nose: Nose normal. No mucosal edema, rhinorrhea or nasal deformity. No epistaxis. Right sinus exhibits no maxillary sinus tenderness and no frontal sinus tenderness. Left sinus exhibits no maxillary sinus tenderness and no frontal sinus tenderness.   Mouth/Throat: Uvula is midline, oropharynx is clear and moist and mucous membranes are normal. There is trismus (2 finger) in the jaw. Abnormal dentition. Dental abscesses and dental caries present. No uvula swelling. No oropharyngeal exudate, posterior oropharyngeal edema or posterior oropharyngeal erythema.       Eyes: Pupils are equal, round, and reactive to light. Conjunctivae, EOM and lids are normal. No scleral icterus.   Sclera clear bilat   Neck: Trachea normal, full passive range of motion without pain and phonation normal. Neck supple. No neck rigidity. No edema and no erythema present.   Cardiovascular: Normal rate, regular rhythm, normal heart sounds, intact distal pulses and normal pulses.   Pulmonary/Chest: Effort normal and breath sounds normal. No respiratory distress. He has no decreased breath sounds. He has no wheezes. He has no rhonchi. He has no rales.   Abdominal: Soft. Normal appearance and bowel sounds are normal. He exhibits no distension. There is no tenderness.   Musculoskeletal: Normal range of motion. He exhibits no edema or deformity.   Lymphadenopathy:     He has no cervical adenopathy.   Neurological: He is alert and oriented to person, place, and time. He exhibits normal muscle tone. Coordination normal.   Skin: Skin is warm, dry and intact. He is not diaphoretic. No pallor.   Psychiatric: He has a normal  mood and affect. His speech is normal and behavior is normal. Judgment and thought content normal. Cognition and memory are normal.   Nursing note and vitals reviewed.      Assessment:       1. Dental abscess    2. Facial cellulitis        Plan:         Dental abscess  -     Discontinue: clindamycin (CLEOCIN) 300 MG capsule; Take 1 capsule (300 mg total) by mouth 3 (three) times daily. for 10 days  Dispense: 30 capsule; Refill: 0  -     Discontinue: cefTRIAXone injection 1 g  -     Refer to Emergency Dept.    Facial cellulitis  -     Refer to Emergency Dept.      Patient Instructions   Please report directly to the emergency department for further evaluation

## 2021-02-09 ENCOUNTER — HOSPITAL ENCOUNTER (INPATIENT)
Facility: HOSPITAL | Age: 41
LOS: 2 days | Discharge: HOME | DRG: 897 | End: 2021-02-11
Attending: PSYCHIATRY & NEUROLOGY | Admitting: PSYCHIATRY & NEUROLOGY
Payer: MEDICAID

## 2021-02-09 DIAGNOSIS — F10.96: Primary | ICD-10-CM

## 2021-02-09 DIAGNOSIS — F17.210 CIGARETTE NICOTINE DEPENDENCE WITHOUT COMPLICATION: ICD-10-CM

## 2021-02-09 DIAGNOSIS — F10.20 ALCOHOL USE DISORDER, SEVERE, DEPENDENCE: ICD-10-CM

## 2021-02-09 DIAGNOSIS — D53.9 MACROCYTIC ANEMIA: ICD-10-CM

## 2021-02-09 DIAGNOSIS — F10.96: ICD-10-CM

## 2021-02-09 DIAGNOSIS — R79.89 ABNORMAL TSH: ICD-10-CM

## 2021-02-09 PROCEDURE — 11400000 HC PSYCH PRIVATE ROOM

## 2021-02-09 PROCEDURE — 99222 1ST HOSP IP/OBS MODERATE 55: CPT | Mod: ,,, | Performed by: PSYCHIATRY & NEUROLOGY

## 2021-02-09 PROCEDURE — 25000003 PHARM REV CODE 250: Performed by: PSYCHIATRY & NEUROLOGY

## 2021-02-09 PROCEDURE — 90833 PR PSYCHOTHERAPY W/PATIENT W/E&M, 30 MIN (ADD ON): ICD-10-PCS | Mod: ,,, | Performed by: PSYCHIATRY & NEUROLOGY

## 2021-02-09 PROCEDURE — 99222 PR INITIAL HOSPITAL CARE,LEVL II: ICD-10-PCS | Mod: ,,, | Performed by: PSYCHIATRY & NEUROLOGY

## 2021-02-09 PROCEDURE — 90833 PSYTX W PT W E/M 30 MIN: CPT | Mod: ,,, | Performed by: PSYCHIATRY & NEUROLOGY

## 2021-02-09 RX ORDER — THIAMINE HCL 100 MG
100 TABLET ORAL 2 TIMES DAILY
Status: DISCONTINUED | OUTPATIENT
Start: 2021-02-09 | End: 2021-02-11 | Stop reason: HOSPADM

## 2021-02-09 RX ORDER — LANOLIN ALCOHOL/MO/W.PET/CERES
400 CREAM (GRAM) TOPICAL 2 TIMES DAILY
Status: DISCONTINUED | OUTPATIENT
Start: 2021-02-09 | End: 2021-02-11 | Stop reason: HOSPADM

## 2021-02-09 RX ORDER — HYDROXYZINE PAMOATE 50 MG/1
50 CAPSULE ORAL EVERY 6 HOURS PRN
Status: DISCONTINUED | OUTPATIENT
Start: 2021-02-09 | End: 2021-02-11 | Stop reason: HOSPADM

## 2021-02-09 RX ORDER — OLANZAPINE 10 MG/2ML
10 INJECTION, POWDER, FOR SOLUTION INTRAMUSCULAR EVERY 4 HOURS PRN
Status: DISCONTINUED | OUTPATIENT
Start: 2021-02-09 | End: 2021-02-11 | Stop reason: HOSPADM

## 2021-02-09 RX ORDER — PROMETHAZINE HYDROCHLORIDE 25 MG/1
25 TABLET ORAL EVERY 6 HOURS PRN
Status: DISCONTINUED | OUTPATIENT
Start: 2021-02-09 | End: 2021-02-11 | Stop reason: HOSPADM

## 2021-02-09 RX ORDER — DIAZEPAM 5 MG/1
5 TABLET ORAL 3 TIMES DAILY
Status: DISCONTINUED | OUTPATIENT
Start: 2021-02-09 | End: 2021-02-10

## 2021-02-09 RX ORDER — IBUPROFEN 400 MG/1
400 TABLET ORAL EVERY 6 HOURS PRN
Status: DISCONTINUED | OUTPATIENT
Start: 2021-02-09 | End: 2021-02-09

## 2021-02-09 RX ORDER — LOPERAMIDE HYDROCHLORIDE 2 MG/1
2 CAPSULE ORAL
Status: DISCONTINUED | OUTPATIENT
Start: 2021-02-09 | End: 2021-02-11 | Stop reason: HOSPADM

## 2021-02-09 RX ORDER — IBUPROFEN 800 MG/1
800 TABLET ORAL EVERY 8 HOURS PRN
Status: DISCONTINUED | OUTPATIENT
Start: 2021-02-09 | End: 2021-02-11 | Stop reason: HOSPADM

## 2021-02-09 RX ORDER — FOLIC ACID 1 MG/1
1 TABLET ORAL DAILY
Status: DISCONTINUED | OUTPATIENT
Start: 2021-02-09 | End: 2021-02-09

## 2021-02-09 RX ORDER — IBUPROFEN 200 MG
1 TABLET ORAL DAILY PRN
Status: DISCONTINUED | OUTPATIENT
Start: 2021-02-09 | End: 2021-02-11 | Stop reason: HOSPADM

## 2021-02-09 RX ORDER — ONDANSETRON HYDROCHLORIDE 4 MG/5ML
4 SOLUTION ORAL EVERY 6 HOURS PRN
Status: DISCONTINUED | OUTPATIENT
Start: 2021-02-09 | End: 2021-02-11 | Stop reason: HOSPADM

## 2021-02-09 RX ORDER — DOCUSATE SODIUM 100 MG/1
100 CAPSULE, LIQUID FILLED ORAL DAILY PRN
Status: DISCONTINUED | OUTPATIENT
Start: 2021-02-09 | End: 2021-02-11 | Stop reason: HOSPADM

## 2021-02-09 RX ORDER — LEVOTHYROXINE SODIUM 25 UG/1
25 TABLET ORAL
Status: DISCONTINUED | OUTPATIENT
Start: 2021-02-10 | End: 2021-02-10

## 2021-02-09 RX ORDER — OLANZAPINE 10 MG/1
10 TABLET ORAL EVERY 4 HOURS PRN
Status: DISCONTINUED | OUTPATIENT
Start: 2021-02-09 | End: 2021-02-11 | Stop reason: HOSPADM

## 2021-02-09 RX ORDER — MAG HYDROX/ALUMINUM HYD/SIMETH 200-200-20
30 SUSPENSION, ORAL (FINAL DOSE FORM) ORAL EVERY 6 HOURS PRN
Status: DISCONTINUED | OUTPATIENT
Start: 2021-02-09 | End: 2021-02-11 | Stop reason: HOSPADM

## 2021-02-09 RX ORDER — ONDANSETRON 4 MG/1
8 TABLET, ORALLY DISINTEGRATING ORAL EVERY 8 HOURS PRN
Status: DISCONTINUED | OUTPATIENT
Start: 2021-02-09 | End: 2021-02-09 | Stop reason: SDUPTHER

## 2021-02-09 RX ADMIN — ONDANSETRON 8 MG: 4 TABLET, ORALLY DISINTEGRATING ORAL at 01:02

## 2021-02-09 RX ADMIN — Medication 100 MG: at 01:02

## 2021-02-09 RX ADMIN — HYDROXYZINE PAMOATE 50 MG: 50 CAPSULE ORAL at 01:02

## 2021-02-09 RX ADMIN — DIAZEPAM 5 MG: 5 TABLET ORAL at 02:02

## 2021-02-09 RX ADMIN — FOLIC ACID 1 MG: 1 TABLET ORAL at 11:02

## 2021-02-09 RX ADMIN — THERA TABS 1 TABLET: TAB at 11:02

## 2021-02-09 RX ADMIN — DIAZEPAM 5 MG: 5 TABLET ORAL at 08:02

## 2021-02-09 RX ADMIN — Medication 400 MG: at 08:02

## 2021-02-09 RX ADMIN — IBUPROFEN 400 MG: 400 TABLET, FILM COATED ORAL at 11:02

## 2021-02-09 RX ADMIN — Medication 100 MG: at 08:02

## 2021-02-10 PROBLEM — E53.8 B12 DEFICIENCY: Status: RESOLVED | Noted: 2021-02-10 | Resolved: 2021-02-10

## 2021-02-10 PROBLEM — R79.89 ABNORMAL TSH: Status: ACTIVE | Noted: 2021-02-10

## 2021-02-10 PROBLEM — D53.9 MACROCYTIC ANEMIA: Status: ACTIVE | Noted: 2021-02-10

## 2021-02-10 PROBLEM — E53.8 B12 DEFICIENCY: Status: ACTIVE | Noted: 2021-02-10

## 2021-02-10 LAB
CHOLEST SERPL-MCNC: 115 MG/DL (ref 120–199)
CHOLEST/HDLC SERPL: 3.7 {RATIO} (ref 2–5)
ESTIMATED AVG GLUCOSE: 91 MG/DL (ref 68–131)
FOLATE SERPL-MCNC: 8.4 NG/ML (ref 4–24)
HBA1C MFR BLD: 4.8 % (ref 4–5.6)
HDLC SERPL-MCNC: 31 MG/DL (ref 40–75)
HDLC SERPL: 27 % (ref 20–50)
LDLC SERPL CALC-MCNC: 64.8 MG/DL (ref 63–159)
NONHDLC SERPL-MCNC: 84 MG/DL
TRIGL SERPL-MCNC: 96 MG/DL (ref 30–150)
VIT B12 SERPL-MCNC: 554 PG/ML (ref 210–950)

## 2021-02-10 PROCEDURE — 90833 PSYTX W PT W E/M 30 MIN: CPT | Mod: ,,, | Performed by: PSYCHIATRY & NEUROLOGY

## 2021-02-10 PROCEDURE — 80061 LIPID PANEL: CPT

## 2021-02-10 PROCEDURE — 99222 PR INITIAL HOSPITAL CARE,LEVL II: ICD-10-PCS | Mod: ,,, | Performed by: NURSE PRACTITIONER

## 2021-02-10 PROCEDURE — 83036 HEMOGLOBIN GLYCOSYLATED A1C: CPT

## 2021-02-10 PROCEDURE — 36415 COLL VENOUS BLD VENIPUNCTURE: CPT

## 2021-02-10 PROCEDURE — 99233 PR SUBSEQUENT HOSPITAL CARE,LEVL III: ICD-10-PCS | Mod: ,,, | Performed by: PSYCHIATRY & NEUROLOGY

## 2021-02-10 PROCEDURE — 99222 1ST HOSP IP/OBS MODERATE 55: CPT | Mod: ,,, | Performed by: NURSE PRACTITIONER

## 2021-02-10 PROCEDURE — 11400000 HC PSYCH PRIVATE ROOM

## 2021-02-10 PROCEDURE — 25000003 PHARM REV CODE 250: Performed by: PSYCHIATRY & NEUROLOGY

## 2021-02-10 PROCEDURE — 99233 SBSQ HOSP IP/OBS HIGH 50: CPT | Mod: ,,, | Performed by: PSYCHIATRY & NEUROLOGY

## 2021-02-10 PROCEDURE — 82607 VITAMIN B-12: CPT

## 2021-02-10 PROCEDURE — 90833 PR PSYCHOTHERAPY W/PATIENT W/E&M, 30 MIN (ADD ON): ICD-10-PCS | Mod: ,,, | Performed by: PSYCHIATRY & NEUROLOGY

## 2021-02-10 PROCEDURE — 82746 ASSAY OF FOLIC ACID SERUM: CPT

## 2021-02-10 PROCEDURE — 84425 ASSAY OF VITAMIN B-1: CPT

## 2021-02-10 RX ORDER — DIAZEPAM 5 MG/1
5 TABLET ORAL 2 TIMES DAILY
Status: DISCONTINUED | OUTPATIENT
Start: 2021-02-10 | End: 2021-02-11 | Stop reason: HOSPADM

## 2021-02-10 RX ADMIN — THERA TABS 1 TABLET: TAB at 08:02

## 2021-02-10 RX ADMIN — DIAZEPAM 5 MG: 5 TABLET ORAL at 08:02

## 2021-02-10 RX ADMIN — Medication 100 MG: at 08:02

## 2021-02-10 RX ADMIN — LEVOTHYROXINE SODIUM 25 MCG: 25 TABLET ORAL at 06:02

## 2021-02-10 RX ADMIN — Medication 400 MG: at 08:02

## 2021-02-10 RX ADMIN — FOLIC ACID-PYRIDOXINE-CYANOCOBALAMIN TAB 2.5-25-2 MG 1 TABLET: 2.5-25-2 TAB at 09:02

## 2021-02-11 VITALS
WEIGHT: 155.63 LBS | SYSTOLIC BLOOD PRESSURE: 108 MMHG | HEIGHT: 69 IN | DIASTOLIC BLOOD PRESSURE: 74 MMHG | BODY MASS INDEX: 23.05 KG/M2 | HEART RATE: 104 BPM | TEMPERATURE: 98 F | RESPIRATION RATE: 18 BRPM

## 2021-02-11 PROCEDURE — 25000003 PHARM REV CODE 250: Performed by: PSYCHIATRY & NEUROLOGY

## 2021-02-11 PROCEDURE — 90833 PR PSYCHOTHERAPY W/PATIENT W/E&M, 30 MIN (ADD ON): ICD-10-PCS | Mod: ,,, | Performed by: PSYCHIATRY & NEUROLOGY

## 2021-02-11 PROCEDURE — 99239 HOSP IP/OBS DSCHRG MGMT >30: CPT | Mod: ,,, | Performed by: PSYCHIATRY & NEUROLOGY

## 2021-02-11 PROCEDURE — 99239 PR HOSPITAL DISCHARGE DAY,>30 MIN: ICD-10-PCS | Mod: ,,, | Performed by: PSYCHIATRY & NEUROLOGY

## 2021-02-11 PROCEDURE — 90833 PSYTX W PT W E/M 30 MIN: CPT | Mod: ,,, | Performed by: PSYCHIATRY & NEUROLOGY

## 2021-02-11 RX ORDER — LANOLIN ALCOHOL/MO/W.PET/CERES
100 CREAM (GRAM) TOPICAL 2 TIMES DAILY
Qty: 60 TABLET | Refills: 1 | Status: ON HOLD | OUTPATIENT
Start: 2021-02-11 | End: 2021-03-22 | Stop reason: HOSPADM

## 2021-02-11 RX ORDER — LEVOTHYROXINE SODIUM 25 UG/1
25 TABLET ORAL
Qty: 30 TABLET | Refills: 1 | Status: ON HOLD | OUTPATIENT
Start: 2021-02-11 | End: 2021-03-22 | Stop reason: HOSPADM

## 2021-02-11 RX ORDER — IBUPROFEN 200 MG
1 TABLET ORAL DAILY
Status: ON HOLD | COMMUNITY
Start: 2021-02-11 | End: 2021-03-22 | Stop reason: HOSPADM

## 2021-02-11 RX ORDER — LANOLIN ALCOHOL/MO/W.PET/CERES
400 CREAM (GRAM) TOPICAL 2 TIMES DAILY
Qty: 60 TABLET | Refills: 1 | Status: ON HOLD | OUTPATIENT
Start: 2021-02-11 | End: 2021-03-22 | Stop reason: HOSPADM

## 2021-02-11 RX ADMIN — THERA TABS 1 TABLET: TAB at 08:02

## 2021-02-11 RX ADMIN — FOLIC ACID-PYRIDOXINE-CYANOCOBALAMIN TAB 2.5-25-2 MG 1 TABLET: 2.5-25-2 TAB at 08:02

## 2021-02-11 RX ADMIN — Medication 400 MG: at 08:02

## 2021-02-11 RX ADMIN — DIAZEPAM 5 MG: 5 TABLET ORAL at 08:02

## 2021-02-11 RX ADMIN — Medication 100 MG: at 08:02

## 2021-02-12 LAB — VIT B1 BLD-MCNC: 37 UG/L (ref 38–122)

## 2021-03-19 ENCOUNTER — HOSPITAL ENCOUNTER (INPATIENT)
Facility: HOSPITAL | Age: 41
LOS: 3 days | Discharge: HOME | DRG: 897 | End: 2021-03-22
Attending: STUDENT IN AN ORGANIZED HEALTH CARE EDUCATION/TRAINING PROGRAM | Admitting: STUDENT IN AN ORGANIZED HEALTH CARE EDUCATION/TRAINING PROGRAM
Payer: MEDICAID

## 2021-03-19 DIAGNOSIS — F41.1 GAD (GENERALIZED ANXIETY DISORDER): ICD-10-CM

## 2021-03-19 DIAGNOSIS — E03.8 OTHER SPECIFIED HYPOTHYROIDISM: ICD-10-CM

## 2021-03-19 DIAGNOSIS — F10.939 WITHDRAWAL SYMPTOMS, ALCOHOL: ICD-10-CM

## 2021-03-19 DIAGNOSIS — F10.20 ALCOHOL USE DISORDER, SEVERE, DEPENDENCE: ICD-10-CM

## 2021-03-19 DIAGNOSIS — F10.94 ALCOHOL-INDUCED MOOD DISORDER: Primary | ICD-10-CM

## 2021-03-19 DIAGNOSIS — R45.851 SUICIDAL IDEATIONS: ICD-10-CM

## 2021-03-19 PROCEDURE — 11400000 HC PSYCH PRIVATE ROOM

## 2021-03-19 RX ORDER — OLANZAPINE 10 MG/2ML
10 INJECTION, POWDER, FOR SOLUTION INTRAMUSCULAR EVERY 4 HOURS PRN
Status: DISCONTINUED | OUTPATIENT
Start: 2021-03-19 | End: 2021-03-22 | Stop reason: HOSPADM

## 2021-03-19 RX ORDER — OLANZAPINE 10 MG/1
10 TABLET ORAL EVERY 4 HOURS PRN
Status: DISCONTINUED | OUTPATIENT
Start: 2021-03-19 | End: 2021-03-22 | Stop reason: HOSPADM

## 2021-03-19 RX ORDER — IBUPROFEN 200 MG
1 TABLET ORAL DAILY PRN
Status: DISCONTINUED | OUTPATIENT
Start: 2021-03-19 | End: 2021-03-22 | Stop reason: HOSPADM

## 2021-03-19 RX ORDER — IBUPROFEN 400 MG/1
400 TABLET ORAL EVERY 6 HOURS PRN
Status: DISCONTINUED | OUTPATIENT
Start: 2021-03-19 | End: 2021-03-22 | Stop reason: HOSPADM

## 2021-03-19 RX ORDER — HYDROXYZINE HYDROCHLORIDE 25 MG/1
50 TABLET, FILM COATED ORAL NIGHTLY PRN
Status: DISCONTINUED | OUTPATIENT
Start: 2021-03-19 | End: 2021-03-22 | Stop reason: HOSPADM

## 2021-03-19 RX ORDER — THIAMINE HCL 100 MG
100 TABLET ORAL DAILY
Status: DISCONTINUED | OUTPATIENT
Start: 2021-03-20 | End: 2021-03-22 | Stop reason: HOSPADM

## 2021-03-19 RX ORDER — FOLIC ACID 1 MG/1
1 TABLET ORAL DAILY
Status: DISCONTINUED | OUTPATIENT
Start: 2021-03-20 | End: 2021-03-22 | Stop reason: HOSPADM

## 2021-03-20 PROBLEM — E03.8 OTHER SPECIFIED HYPOTHYROIDISM: Status: ACTIVE | Noted: 2021-03-20

## 2021-03-20 PROCEDURE — 99221 1ST HOSP IP/OBS SF/LOW 40: CPT | Mod: ,,, | Performed by: FAMILY MEDICINE

## 2021-03-20 PROCEDURE — 25000003 PHARM REV CODE 250: Performed by: STUDENT IN AN ORGANIZED HEALTH CARE EDUCATION/TRAINING PROGRAM

## 2021-03-20 PROCEDURE — 25000003 PHARM REV CODE 250: Performed by: FAMILY MEDICINE

## 2021-03-20 PROCEDURE — 99223 PR INITIAL HOSPITAL CARE,LEVL III: ICD-10-PCS | Mod: ,,, | Performed by: STUDENT IN AN ORGANIZED HEALTH CARE EDUCATION/TRAINING PROGRAM

## 2021-03-20 PROCEDURE — 11400000 HC PSYCH PRIVATE ROOM

## 2021-03-20 PROCEDURE — 99223 1ST HOSP IP/OBS HIGH 75: CPT | Mod: ,,, | Performed by: STUDENT IN AN ORGANIZED HEALTH CARE EDUCATION/TRAINING PROGRAM

## 2021-03-20 PROCEDURE — 99221 PR INITIAL HOSPITAL CARE,LEVL I: ICD-10-PCS | Mod: ,,, | Performed by: FAMILY MEDICINE

## 2021-03-20 RX ORDER — DIAZEPAM 10 MG/1
10 TABLET ORAL 3 TIMES DAILY
Status: DISCONTINUED | OUTPATIENT
Start: 2021-03-20 | End: 2021-03-21

## 2021-03-20 RX ORDER — LEVOTHYROXINE SODIUM 25 UG/1
50 TABLET ORAL
Status: DISCONTINUED | OUTPATIENT
Start: 2021-03-20 | End: 2021-03-22 | Stop reason: HOSPADM

## 2021-03-20 RX ORDER — GABAPENTIN 300 MG/1
300 CAPSULE ORAL 3 TIMES DAILY
Status: DISCONTINUED | OUTPATIENT
Start: 2021-03-20 | End: 2021-03-21

## 2021-03-20 RX ORDER — DIAZEPAM 10 MG/1
10 TABLET ORAL 2 TIMES DAILY
Status: DISCONTINUED | OUTPATIENT
Start: 2021-03-20 | End: 2021-03-20

## 2021-03-20 RX ADMIN — DIAZEPAM 10 MG: 10 TABLET ORAL at 01:03

## 2021-03-20 RX ADMIN — FOLIC ACID 1 MG: 1 TABLET ORAL at 09:03

## 2021-03-20 RX ADMIN — DIAZEPAM 10 MG: 10 TABLET ORAL at 08:03

## 2021-03-20 RX ADMIN — GABAPENTIN 300 MG: 300 CAPSULE ORAL at 03:03

## 2021-03-20 RX ADMIN — THIAMINE HCL TAB 100 MG 100 MG: 100 TAB at 09:03

## 2021-03-20 RX ADMIN — THERA TABS 1 TABLET: TAB at 09:03

## 2021-03-20 RX ADMIN — GABAPENTIN 300 MG: 300 CAPSULE ORAL at 08:03

## 2021-03-20 RX ADMIN — LEVOTHYROXINE SODIUM 50 MCG: 25 TABLET ORAL at 12:03

## 2021-03-21 PROCEDURE — 99233 PR SUBSEQUENT HOSPITAL CARE,LEVL III: ICD-10-PCS | Mod: ,,, | Performed by: STUDENT IN AN ORGANIZED HEALTH CARE EDUCATION/TRAINING PROGRAM

## 2021-03-21 PROCEDURE — 11400000 HC PSYCH PRIVATE ROOM

## 2021-03-21 PROCEDURE — S4991 NICOTINE PATCH NONLEGEND: HCPCS | Performed by: STUDENT IN AN ORGANIZED HEALTH CARE EDUCATION/TRAINING PROGRAM

## 2021-03-21 PROCEDURE — 25000003 PHARM REV CODE 250: Performed by: FAMILY MEDICINE

## 2021-03-21 PROCEDURE — 99233 SBSQ HOSP IP/OBS HIGH 50: CPT | Mod: ,,, | Performed by: STUDENT IN AN ORGANIZED HEALTH CARE EDUCATION/TRAINING PROGRAM

## 2021-03-21 PROCEDURE — 25000003 PHARM REV CODE 250: Performed by: STUDENT IN AN ORGANIZED HEALTH CARE EDUCATION/TRAINING PROGRAM

## 2021-03-21 RX ORDER — GABAPENTIN 300 MG/1
600 CAPSULE ORAL 3 TIMES DAILY
Status: DISCONTINUED | OUTPATIENT
Start: 2021-03-21 | End: 2021-03-22 | Stop reason: HOSPADM

## 2021-03-21 RX ORDER — DIAZEPAM 10 MG/1
10 TABLET ORAL 2 TIMES DAILY
Status: DISCONTINUED | OUTPATIENT
Start: 2021-03-21 | End: 2021-03-22

## 2021-03-21 RX ADMIN — DIAZEPAM 10 MG: 10 TABLET ORAL at 08:03

## 2021-03-21 RX ADMIN — LEVOTHYROXINE SODIUM 50 MCG: 25 TABLET ORAL at 05:03

## 2021-03-21 RX ADMIN — GABAPENTIN 600 MG: 300 CAPSULE ORAL at 08:03

## 2021-03-21 RX ADMIN — GABAPENTIN 300 MG: 300 CAPSULE ORAL at 08:03

## 2021-03-21 RX ADMIN — FOLIC ACID 1 MG: 1 TABLET ORAL at 08:03

## 2021-03-21 RX ADMIN — THIAMINE HCL TAB 100 MG 100 MG: 100 TAB at 08:03

## 2021-03-21 RX ADMIN — HYDROXYZINE HYDROCHLORIDE 50 MG: 25 TABLET, FILM COATED ORAL at 10:03

## 2021-03-21 RX ADMIN — NICOTINE 1 PATCH: 14 PATCH, EXTENDED RELEASE TRANSDERMAL at 02:03

## 2021-03-21 RX ADMIN — THERA TABS 1 TABLET: TAB at 08:03

## 2021-03-21 RX ADMIN — GABAPENTIN 600 MG: 300 CAPSULE ORAL at 02:03

## 2021-03-22 VITALS
RESPIRATION RATE: 20 BRPM | HEART RATE: 89 BPM | HEIGHT: 71 IN | SYSTOLIC BLOOD PRESSURE: 120 MMHG | WEIGHT: 168 LBS | DIASTOLIC BLOOD PRESSURE: 59 MMHG | TEMPERATURE: 98 F | BODY MASS INDEX: 23.52 KG/M2

## 2021-03-22 PROBLEM — R45.851 SUICIDAL IDEATIONS: Status: RESOLVED | Noted: 2021-03-19 | Resolved: 2021-03-22

## 2021-03-22 PROCEDURE — 90833 PR PSYCHOTHERAPY W/PATIENT W/E&M, 30 MIN (ADD ON): ICD-10-PCS | Mod: ,,, | Performed by: PSYCHIATRY & NEUROLOGY

## 2021-03-22 PROCEDURE — 90833 PSYTX W PT W E/M 30 MIN: CPT | Mod: ,,, | Performed by: PSYCHIATRY & NEUROLOGY

## 2021-03-22 PROCEDURE — 99239 HOSP IP/OBS DSCHRG MGMT >30: CPT | Mod: ,,, | Performed by: PSYCHIATRY & NEUROLOGY

## 2021-03-22 PROCEDURE — 25000003 PHARM REV CODE 250: Performed by: FAMILY MEDICINE

## 2021-03-22 PROCEDURE — 25000003 PHARM REV CODE 250: Performed by: STUDENT IN AN ORGANIZED HEALTH CARE EDUCATION/TRAINING PROGRAM

## 2021-03-22 PROCEDURE — 99239 PR HOSPITAL DISCHARGE DAY,>30 MIN: ICD-10-PCS | Mod: ,,, | Performed by: PSYCHIATRY & NEUROLOGY

## 2021-03-22 RX ORDER — LEVOTHYROXINE SODIUM 50 UG/1
50 TABLET ORAL
Qty: 30 TABLET | Refills: 2 | Status: SHIPPED | OUTPATIENT
Start: 2021-03-23 | End: 2022-03-23

## 2021-03-22 RX ORDER — GABAPENTIN 300 MG/1
600 CAPSULE ORAL 3 TIMES DAILY
Qty: 180 CAPSULE | Refills: 2 | Status: SHIPPED | OUTPATIENT
Start: 2021-03-22 | End: 2022-03-22

## 2021-03-22 RX ORDER — IBUPROFEN 200 MG
1 TABLET ORAL DAILY
COMMUNITY
Start: 2021-03-22

## 2021-03-22 RX ADMIN — LEVOTHYROXINE SODIUM 50 MCG: 25 TABLET ORAL at 06:03

## 2021-03-22 RX ADMIN — THIAMINE HCL TAB 100 MG 100 MG: 100 TAB at 08:03

## 2021-03-22 RX ADMIN — THERA TABS 1 TABLET: TAB at 08:03

## 2021-03-22 RX ADMIN — GABAPENTIN 600 MG: 300 CAPSULE ORAL at 02:03

## 2021-03-22 RX ADMIN — FOLIC ACID 1 MG: 1 TABLET ORAL at 08:03

## 2021-03-22 RX ADMIN — DIAZEPAM 10 MG: 10 TABLET ORAL at 08:03

## 2021-03-22 RX ADMIN — GABAPENTIN 600 MG: 300 CAPSULE ORAL at 08:03

## 2025-04-10 ENCOUNTER — RESULTS FOLLOW-UP (OUTPATIENT)
Dept: GASTROENTEROLOGY | Facility: OTHER | Age: 45
End: 2025-04-10